# Patient Record
Sex: FEMALE | Race: WHITE | Employment: PART TIME | URBAN - NONMETROPOLITAN AREA
[De-identification: names, ages, dates, MRNs, and addresses within clinical notes are randomized per-mention and may not be internally consistent; named-entity substitution may affect disease eponyms.]

---

## 2017-01-19 ENCOUNTER — TELEPHONE (OUTPATIENT)
Dept: FAMILY MEDICINE CLINIC | Facility: CLINIC | Age: 62
End: 2017-01-19

## 2017-01-19 NOTE — TELEPHONE ENCOUNTER
Spouse advised and scheduled  Future Appointments  Date Time Provider Janet Jimenez   1/20/2017 9:45 AM Holly Chacon., DO [facility]

## 2017-01-19 NOTE — TELEPHONE ENCOUNTER
Bj Sin states that she got sick last week after taking care of grandson who had a runny nose.  Pt states that it started off as weakness and head congestion and now it is all in her chest. Pt states she was taking advil cold and sinus and that did help with

## 2017-02-06 ENCOUNTER — MED REC SCAN ONLY (OUTPATIENT)
Dept: FAMILY MEDICINE CLINIC | Facility: CLINIC | Age: 62
End: 2017-02-06

## 2017-08-19 ENCOUNTER — TELEPHONE (OUTPATIENT)
Dept: FAMILY MEDICINE CLINIC | Facility: CLINIC | Age: 62
End: 2017-08-19

## 2017-08-19 NOTE — TELEPHONE ENCOUNTER
IS THERE SOMETHING SHE CAN BE PRESCRIBED FOR POISON MANFRED THAT IS STRONGER THAN HYDROCORTISONE CREAM OR CALAMINE LOTION?

## 2017-08-19 NOTE — TELEPHONE ENCOUNTER
Called and spoke to patient, she states she has had it for 2 weeks, she has it on ankles, knees, thighs, calfs, and left arm. She is using hydrocortisone cream and calamine lotion which is giving her some relief for a little while but then comes back.  Pa

## 2017-08-22 ENCOUNTER — OFFICE VISIT (OUTPATIENT)
Dept: FAMILY MEDICINE CLINIC | Facility: CLINIC | Age: 62
End: 2017-08-22

## 2017-08-22 VITALS
SYSTOLIC BLOOD PRESSURE: 138 MMHG | OXYGEN SATURATION: 98 % | HEART RATE: 68 BPM | TEMPERATURE: 98 F | BODY MASS INDEX: 33 KG/M2 | WEIGHT: 202 LBS | DIASTOLIC BLOOD PRESSURE: 80 MMHG

## 2017-08-22 DIAGNOSIS — L25.5 RHUS DERMATITIS: Primary | ICD-10-CM

## 2017-08-22 PROCEDURE — 99213 OFFICE O/P EST LOW 20 MIN: CPT | Performed by: PHYSICIAN ASSISTANT

## 2017-08-22 RX ORDER — PREDNISONE 10 MG/1
TABLET ORAL
Qty: 30 TABLET | Refills: 0 | Status: SHIPPED | OUTPATIENT
Start: 2017-08-22 | End: 2019-02-27 | Stop reason: ALTCHOICE

## 2017-08-22 RX ORDER — BRINZOLAMIDE 10 MG/ML
1 SUSPENSION/ DROPS OPHTHALMIC 3 TIMES DAILY
COMMUNITY
End: 2019-02-27 | Stop reason: DRUGHIGH

## 2017-08-22 RX ORDER — CLOBETASOL PROPIONATE 0.5 MG/G
CREAM TOPICAL
Qty: 30 G | Refills: 1 | Status: SHIPPED | OUTPATIENT
Start: 2017-08-22 | End: 2019-02-27 | Stop reason: ALTCHOICE

## 2017-08-22 NOTE — PATIENT INSTRUCTIONS
1.  Prednisone taper as prescribed over 10 days. 2.  Clobetasol cream as directed, may apply to affected areas once or twice daily as needed for up to 2 weeks. Do not use on face.          Managing a Poison Ivy, [place], or [employer] Reaction  If y A mild rash may become red, swollen, and itchy. The rash may form a line on your skin where you brushed against the plant. If you have a severe rash, your itching may worsen. And your rash may blister and ooze. If this happens, seek medical care.  The fluid

## 2017-08-22 NOTE — PROGRESS NOTES
CHIEF COMPLAINT:   Patient presents with:  Derm Problem: x 2-3 weeks. HPI:   Ashley Gregory is a 59year old female who presents for evaluation of a rash. Per patient rash started in the past 2-3 weeks.  Rash has been gradually worsening since GENERAL: feels well otherwise, no fever, no chills. SKIN: Per HPI. No edema. No ulcerations. HEENT: Denies rhinorrhea, edema of the lips or swelling of throat. CARDIOVASCULAR: Denies chest pains or palpitations.   LUNGS: Denies shortness of breath with e Sig: Apply to affected areas once or twice daily as needed. Do not use on face.       predniSONE 10 MG Oral Tab 30 tablet 0      Si tabs po qd x 2 days, 4 tabs po qd x 2 days, 3 tabs po qd x 2 days, 2 tabs po qd x 2 days, 1 tab po qd x 2 days then s · Use over-the-counter treatments on your skin, such as cortisone, compresses of Burow’s solution, and calamine lotion. How your skin may react  A mild rash may become red, swollen, and itchy.  The rash may form a line on your skin where you brushed agains

## 2019-01-25 ENCOUNTER — TELEPHONE (OUTPATIENT)
Dept: FAMILY MEDICINE CLINIC | Facility: CLINIC | Age: 64
End: 2019-01-25

## 2019-01-25 NOTE — TELEPHONE ENCOUNTER
Left detailed message for pt to call back and let us know who PCP is. If no longer Dr. Racheal Puga, we need to know. Otherwise she needs to schedule a complete physical and fasting labs.

## 2019-02-27 ENCOUNTER — OFFICE VISIT (OUTPATIENT)
Dept: FAMILY MEDICINE CLINIC | Facility: CLINIC | Age: 64
End: 2019-02-27
Payer: MEDICARE

## 2019-02-27 ENCOUNTER — LAB ENCOUNTER (OUTPATIENT)
Dept: LAB | Age: 64
End: 2019-02-27
Attending: FAMILY MEDICINE
Payer: COMMERCIAL

## 2019-02-27 VITALS
HEIGHT: 62 IN | TEMPERATURE: 98 F | RESPIRATION RATE: 12 BRPM | SYSTOLIC BLOOD PRESSURE: 138 MMHG | DIASTOLIC BLOOD PRESSURE: 80 MMHG | BODY MASS INDEX: 36.09 KG/M2 | WEIGHT: 196.13 LBS | HEART RATE: 72 BPM

## 2019-02-27 DIAGNOSIS — H40.1111 PRIMARY OPEN ANGLE GLAUCOMA (POAG) OF RIGHT EYE, MILD STAGE: Primary | ICD-10-CM

## 2019-02-27 DIAGNOSIS — Z13.220 SCREENING FOR LIPID DISORDERS: ICD-10-CM

## 2019-02-27 DIAGNOSIS — Z00.00 LABORATORY EXAM ORDERED AS PART OF ROUTINE GENERAL MEDICAL EXAMINATION: ICD-10-CM

## 2019-02-27 DIAGNOSIS — Z11.59 NEED FOR HEPATITIS C SCREENING TEST: ICD-10-CM

## 2019-02-27 DIAGNOSIS — Z12.31 VISIT FOR SCREENING MAMMOGRAM: ICD-10-CM

## 2019-02-27 DIAGNOSIS — Z13.0 SCREENING, ANEMIA, DEFICIENCY, IRON: ICD-10-CM

## 2019-02-27 DIAGNOSIS — Z78.0 POSTMENOPAUSAL: ICD-10-CM

## 2019-02-27 DIAGNOSIS — H40.1122 PRIMARY OPEN ANGLE GLAUCOMA (POAG) OF LEFT EYE, MODERATE STAGE: ICD-10-CM

## 2019-02-27 DIAGNOSIS — Z12.4 CERVICAL CANCER SCREENING: ICD-10-CM

## 2019-02-27 DIAGNOSIS — Z00.00 ENCOUNTER FOR ANNUAL HEALTH EXAMINATION: ICD-10-CM

## 2019-02-27 DIAGNOSIS — E66.9 OBESITY (BMI 35.0-39.9 WITHOUT COMORBIDITY): ICD-10-CM

## 2019-02-27 DIAGNOSIS — Z23 NEED FOR VACCINATION: ICD-10-CM

## 2019-02-27 LAB
ALBUMIN SERPL-MCNC: 4 G/DL (ref 3.4–5)
ALBUMIN/GLOB SERPL: 1 {RATIO} (ref 1–2)
ALP LIVER SERPL-CCNC: 78 U/L (ref 50–130)
ALT SERPL-CCNC: 22 U/L (ref 13–56)
ANION GAP SERPL CALC-SCNC: 8 MMOL/L (ref 0–18)
AST SERPL-CCNC: 18 U/L (ref 15–37)
BASOPHILS # BLD AUTO: 0.04 X10(3) UL (ref 0–0.2)
BASOPHILS NFR BLD AUTO: 0.6 %
BILIRUB SERPL-MCNC: 0.4 MG/DL (ref 0.1–2)
BUN BLD-MCNC: 21 MG/DL (ref 7–18)
BUN/CREAT SERPL: 18.8 (ref 10–20)
CALCIUM BLD-MCNC: 9.5 MG/DL (ref 8.5–10.1)
CHLORIDE SERPL-SCNC: 109 MMOL/L (ref 98–107)
CHOLEST SMN-MCNC: 225 MG/DL (ref ?–200)
CO2 SERPL-SCNC: 24 MMOL/L (ref 21–32)
CREAT BLD-MCNC: 1.12 MG/DL (ref 0.55–1.02)
DEPRECATED RDW RBC AUTO: 42.5 FL (ref 35.1–46.3)
EOSINOPHIL # BLD AUTO: 0.2 X10(3) UL (ref 0–0.7)
EOSINOPHIL NFR BLD AUTO: 3 %
ERYTHROCYTE [DISTWIDTH] IN BLOOD BY AUTOMATED COUNT: 13 % (ref 11–15)
GLOBULIN PLAS-MCNC: 4 G/DL (ref 2.8–4.4)
GLUCOSE BLD-MCNC: 93 MG/DL (ref 70–99)
HCT VFR BLD AUTO: 42.8 % (ref 35–48)
HCV AB SERPL QL IA: NONREACTIVE
HDLC SERPL-MCNC: 71 MG/DL (ref 40–59)
HGB BLD-MCNC: 14.2 G/DL (ref 12–16)
IMM GRANULOCYTES # BLD AUTO: 0.01 X10(3) UL (ref 0–1)
IMM GRANULOCYTES NFR BLD: 0.1 %
LDLC SERPL CALC-MCNC: 130 MG/DL (ref ?–100)
LYMPHOCYTES # BLD AUTO: 2.27 X10(3) UL (ref 1–4)
LYMPHOCYTES NFR BLD AUTO: 33.5 %
M PROTEIN MFR SERPL ELPH: 8 G/DL (ref 6.4–8.2)
MCH RBC QN AUTO: 30 PG (ref 26–34)
MCHC RBC AUTO-ENTMCNC: 33.2 G/DL (ref 31–37)
MCV RBC AUTO: 90.5 FL (ref 80–100)
MONOCYTES # BLD AUTO: 0.7 X10(3) UL (ref 0.1–1)
MONOCYTES NFR BLD AUTO: 10.3 %
NEUTROPHILS # BLD AUTO: 3.55 X10 (3) UL (ref 1.5–7.7)
NEUTROPHILS # BLD AUTO: 3.55 X10(3) UL (ref 1.5–7.7)
NEUTROPHILS NFR BLD AUTO: 52.5 %
NONHDLC SERPL-MCNC: 154 MG/DL (ref ?–130)
OSMOLALITY SERPL CALC.SUM OF ELEC: 295 MOSM/KG (ref 275–295)
PLATELET # BLD AUTO: 333 10(3)UL (ref 150–450)
POTASSIUM SERPL-SCNC: 4.1 MMOL/L (ref 3.5–5.1)
RBC # BLD AUTO: 4.73 X10(6)UL (ref 3.8–5.3)
SODIUM SERPL-SCNC: 141 MMOL/L (ref 136–145)
TRIGL SERPL-MCNC: 122 MG/DL (ref 30–149)
VLDLC SERPL CALC-MCNC: 24 MG/DL (ref 0–30)
WBC # BLD AUTO: 6.8 X10(3) UL (ref 4–11)

## 2019-02-27 PROCEDURE — G0402 INITIAL PREVENTIVE EXAM: HCPCS | Performed by: FAMILY MEDICINE

## 2019-02-27 PROCEDURE — 88175 CYTOPATH C/V AUTO FLUID REDO: CPT | Performed by: FAMILY MEDICINE

## 2019-02-27 PROCEDURE — G0101 CA SCREEN;PELVIC/BREAST EXAM: HCPCS | Performed by: FAMILY MEDICINE

## 2019-02-27 PROCEDURE — 90670 PCV13 VACCINE IM: CPT | Performed by: FAMILY MEDICINE

## 2019-02-27 PROCEDURE — 36415 COLL VENOUS BLD VENIPUNCTURE: CPT | Performed by: FAMILY MEDICINE

## 2019-02-27 PROCEDURE — 87624 HPV HI-RISK TYP POOLED RSLT: CPT | Performed by: FAMILY MEDICINE

## 2019-02-27 PROCEDURE — G0009 ADMIN PNEUMOCOCCAL VACCINE: HCPCS | Performed by: FAMILY MEDICINE

## 2019-02-27 RX ORDER — CARTEOLOL HYDROCHLORIDE 10 MG/ML
1 SOLUTION/ DROPS OPHTHALMIC DAILY
COMMUNITY
Start: 2017-12-08

## 2019-02-27 RX ORDER — BRINZOLAMIDE 10 MG/ML
1 SUSPENSION/ DROPS OPHTHALMIC 2 TIMES DAILY
COMMUNITY
Start: 2018-07-09

## 2019-02-27 NOTE — H&P
HPI:   Ashley Gregory is a 70year old female Patient presents with:  Physical: Welcome to [facility] Readings from Last 6 Encounters:  02/27/19 : 196 lb 2 oz  08/22/17 : 202 lb  10/10/16 : 200 lb  08/27/15 : 196 lb 12.8 oz  08/01/13 : 185 lb 6 oz  0 skin lesions or rashes  EYES:denies blurred vision or double vision, glasses/ contacts: +, last eye exam :2/25/19  HEENT: denies nasal congestion, pnd, or ST, denies snoring and reported periods of apnea, denies hearing deficits, +tinnitus left ear  LUNGS: veins on lower extremities  HEENT: Ears:  TMs intact, canals clear, hearing normal, Nose: turbinates clear,Septum midline, Pharynx: no pnd, erythema, or airway obstruction  EYES:PERRLA, EOMI, Fundi: benign,conjunctiva are clear  NECK: supple,no adenopathy, lifetime)      Lipid Panel [E]      Comp Metabolic Panel (14) [E]      CBC W Differential W Platelet [E]      pneumococcal 13-Kallie Conj Vacc (PREVNAR 13) Intramuscular Suspension      *Venipuncture      ThinPrep PAP with HPV Reflex Request    Meds & Refills Welcome to Medicare, and non-screening if indicated for medical reasons Electrocardiogram date Routine EKG is not a screening covered service except at the Welcome to Medicare Visit    Abdominal aortic aneurysm screening (once between ages 73-68) IPPE only patient. Chlamydia  Annually if high risk No results found for: CHLAMYDIA No flowsheet data found.     Screening Mammogram      Mammogram    Recommend Annually to at least age 76, and as needed after 76 Mammogram due on 11/08/2017 Please get this Mammog Advance Directives. It also has the State forms available on it's website for anyone to review and print using their home computer and printer. (the forms are also available in [address])  [email or web address]  This link also has information from the [facility] [place] [facility] recommendations for cervical cancer screening. As it has been over 6 years since patient has had a Pap, will obtain Pap smear today.   If negative no further cervical cancer screening necessary  - THINPREP PAP WITH HPV REFLEX REQUEST B; Future  Delia Epstein

## 2019-02-27 NOTE — PATIENT INSTRUCTIONS
Natali Lobo's SCREENING SCHEDULE   Tests on this list are recommended by your physician but may not be covered, or covered at this frequency, by your insurer. Please check with your insurance carrier before scheduling to verify coverage.    MANOLO Age 74     Colonoscopy Screen   Covered every 10 years- more often if abnormal Colonoscopy due on 01/31/2020 Update Health Maintenance if applicable    Flex Sigmoidoscopy Screen  Covered every 5 years No results found for this or any previous visit.  No carlton visit on 02/27/19   • PNEUMOCOCCAL VACC, 13 СВЕТЛАНА IM    Please get once after your 65th birthday    Pneumococcal 23 (Pneumovax)  Covered Once after 65 No orders found for this or any previous visit.  Please get once after your 65th birthday    Hepatitis B for Visit for screening mammogram  I discussed importance of monthly self breast exams and annual mammograms. Prescription for mammogram faxed to [facility] patient's request  - [facility] SCREENING BILAT (CPT=[id number]); Future    3.  Need for vaccination  I reviewed a

## 2019-03-01 LAB — HPV I/H RISK 1 DNA SPEC QL NAA+PROBE: NEGATIVE

## 2019-10-02 ENCOUNTER — PATIENT OUTREACH (OUTPATIENT)
Dept: FAMILY MEDICINE CLINIC | Facility: CLINIC | Age: 64
End: 2019-10-02

## 2019-10-19 ENCOUNTER — TELEPHONE (OUTPATIENT)
Dept: FAMILY MEDICINE CLINIC | Facility: CLINIC | Age: 64
End: 2019-10-19

## 2020-02-06 ENCOUNTER — PATIENT OUTREACH (OUTPATIENT)
Dept: FAMILY MEDICINE CLINIC | Facility: CLINIC | Age: 65
End: 2020-02-06

## 2020-08-11 ENCOUNTER — TELEPHONE (OUTPATIENT)
Dept: FAMILY MEDICINE CLINIC | Facility: CLINIC | Age: 65
End: 2020-08-11

## 2020-08-11 DIAGNOSIS — Z12.31 ENCOUNTER FOR SCREENING MAMMOGRAM FOR BREAST CANCER: Primary | ICD-10-CM

## 2020-08-11 NOTE — TELEPHONE ENCOUNTER
11/08/2016 HAD MAMMOGRAM DONE AT [address] [place] [address] Claude PATIENT WOULD LIKE TO HAVE HER MAMMOGRAM DONE AT [facility] IN [address] [place] [address]. PLEASE CALL PATIENT WHEN ORDER IS READY.

## 2020-08-12 ENCOUNTER — TELEPHONE (OUTPATIENT)
Dept: FAMILY MEDICINE CLINIC | Facility: CLINIC | Age: 65
End: 2020-08-12

## 2020-08-12 NOTE — TELEPHONE ENCOUNTER
Pt called yesterday and requested a mammogram order to be faxed to [facility]---where she had her last one done.    I (inadvertently) sent an order for a 2D/3D screening mammogram, rather than just a screening mammogram.    Pt is now asking if she should g

## 2020-08-12 NOTE — TELEPHONE ENCOUNTER
Patient has questions for the nurse regarding the 3D Mammogram and if it is worth getting one done? Is there a facility in the [facility] that does the 3D Mammograms? Please call patient to advise.

## 2020-08-12 NOTE — TELEPHONE ENCOUNTER
Please advise patient that I have not seen her for a year and a half. She should schedule an annual wellness visit. It does not appear that she has had a mammogram for 4 years.   She is seeing a gynecologist or another provider who was performing breast e

## 2020-09-28 ENCOUNTER — HOSPITAL ENCOUNTER (OUTPATIENT)
Dept: MAMMOGRAPHY | Age: 65
Discharge: HOME | End: 2020-09-28
Attending: FAMILY MEDICINE
Payer: MEDICARE

## 2020-09-28 DIAGNOSIS — Z12.31 ENCOUNTER FOR SCREENING MAMMOGRAM FOR BREAST CANCER: ICD-10-CM

## 2020-09-28 PROCEDURE — 77063 BREAST TOMOSYNTHESIS BI: CPT | Performed by: FAMILY MEDICINE

## 2020-09-28 PROCEDURE — 77067 SCR MAMMO BI INCL CAD: CPT | Performed by: FAMILY MEDICINE

## 2020-09-29 ENCOUNTER — TELEPHONE (OUTPATIENT)
Dept: FAMILY MEDICINE CLINIC | Facility: CLINIC | Age: 65
End: 2020-09-29

## 2020-10-08 ENCOUNTER — TELEPHONE (OUTPATIENT)
Dept: FAMILY MEDICINE CLINIC | Facility: CLINIC | Age: 65
End: 2020-10-08

## 2020-10-08 NOTE — TELEPHONE ENCOUNTER
Pt has PCV 13 done on 2/27/19. She asks if she is due/ can she get Pneumovax 23?     Reminded she is due for wellness exam-----2/27/19 was her last OV

## 2020-11-23 ENCOUNTER — OFFICE VISIT (OUTPATIENT)
Dept: FAMILY MEDICINE CLINIC | Facility: CLINIC | Age: 65
End: 2020-11-23
Payer: MEDICARE

## 2020-11-23 VITALS — OXYGEN SATURATION: 99 % | TEMPERATURE: 98 F | HEART RATE: 78 BPM

## 2020-11-23 DIAGNOSIS — H10.33 ACUTE CONJUNCTIVITIS OF BOTH EYES, UNSPECIFIED ACUTE CONJUNCTIVITIS TYPE: Primary | ICD-10-CM

## 2020-11-23 PROCEDURE — 99213 OFFICE O/P EST LOW 20 MIN: CPT | Performed by: FAMILY MEDICINE

## 2020-11-23 NOTE — PROGRESS NOTES
Beba Singh is a 75year old female. Patient presents with:  Irritation: Patient verbalized that she has had red and itchy eyes for 4 days       HPI:   Patient started using a new eyedrop for her glaucoma. She continued her previous.   She started to recorded    GENERAL: well developed, well nourished,in no apparent distress  SKIN: no rashes,no suspicious lesions  HEENT: atraumatic, normocephalic, R TM normal, L TM normal, Pharynx normal  PERRLA, EOMI, palpebral conjunctive are mildly injected, bulbar

## 2021-01-29 ENCOUNTER — TELEPHONE (OUTPATIENT)
Dept: FAMILY MEDICINE CLINIC | Facility: CLINIC | Age: 66
End: 2021-01-29

## 2021-01-29 NOTE — TELEPHONE ENCOUNTER
Spoke with patient and informed her it is a personal choice. She is thinking she will probably get it.

## 2021-01-29 NOTE — TELEPHONE ENCOUNTER
SHE IS GETTING PRESSURE FROM HER WORK PLACE TO GET THE COVID SHOT.  SHE WANTS THE OPINION OF DR Alistair Carey

## 2021-03-15 DIAGNOSIS — Z23 NEED FOR VACCINATION: ICD-10-CM

## 2021-04-24 RX ORDER — CEPHALEXIN 500 MG/1
500 CAPSULE ORAL 3 TIMES DAILY
Qty: 21 CAPSULE | Refills: 0 | Status: SHIPPED | OUTPATIENT
Start: 2021-04-24 | End: 2021-05-01

## 2021-04-26 ENCOUNTER — TELEPHONE (OUTPATIENT)
Dept: FAMILY MEDICINE CLINIC | Facility: CLINIC | Age: 66
End: 2021-04-26

## 2021-04-26 NOTE — TELEPHONE ENCOUNTER
with folliculitis. Pt reports similar rash >4wks. Hasn't seen Dr. Erika Villegas since 2019.     OV scheduled on 4/27 for migdalia

## 2021-04-26 NOTE — TELEPHONE ENCOUNTER
She has a question about the same rash her  has.  He sent a picture of his to Dr Susie Her so she wants to know if she can send a picture of hers

## 2021-04-30 ENCOUNTER — OFFICE VISIT (OUTPATIENT)
Dept: FAMILY MEDICINE CLINIC | Facility: CLINIC | Age: 66
End: 2021-04-30
Payer: MEDICARE

## 2021-04-30 VITALS
SYSTOLIC BLOOD PRESSURE: 110 MMHG | HEART RATE: 68 BPM | RESPIRATION RATE: 14 BRPM | HEIGHT: 62 IN | TEMPERATURE: 98 F | BODY MASS INDEX: 35.7 KG/M2 | DIASTOLIC BLOOD PRESSURE: 76 MMHG | WEIGHT: 194 LBS

## 2021-04-30 DIAGNOSIS — L30.9 DERMATITIS: Primary | ICD-10-CM

## 2021-04-30 PROBLEM — H40.1122 PRIMARY OPEN ANGLE GLAUCOMA OF LEFT EYE, MODERATE STAGE: Status: ACTIVE | Noted: 2019-04-02

## 2021-04-30 PROCEDURE — 3074F SYST BP LT 130 MM HG: CPT | Performed by: FAMILY MEDICINE

## 2021-04-30 PROCEDURE — 3008F BODY MASS INDEX DOCD: CPT | Performed by: FAMILY MEDICINE

## 2021-04-30 PROCEDURE — 3078F DIAST BP <80 MM HG: CPT | Performed by: FAMILY MEDICINE

## 2021-04-30 PROCEDURE — 99213 OFFICE O/P EST LOW 20 MIN: CPT | Performed by: FAMILY MEDICINE

## 2021-04-30 RX ORDER — PERMETHRIN 50 MG/G
CREAM TOPICAL
Qty: 1 TUBE | Refills: 0 | Status: SHIPPED | OUTPATIENT
Start: 2021-04-30 | End: 2021-09-28 | Stop reason: ALTCHOICE

## 2021-04-30 RX ORDER — TRIAMTERENE AND HYDROCHLOROTHIAZIDE 37.5; 25 MG/1; MG/1
1 CAPSULE ORAL DAILY
COMMUNITY
Start: 2021-02-27 | End: 2021-09-28 | Stop reason: ALTCHOICE

## 2021-04-30 NOTE — PATIENT INSTRUCTIONS
I discussed various causes of the rash. Given their pets recent dermatologic problems, would recommend empiric treatment with permethrin. Would treat her  at the same time as well.   Prescriptions provided  Would recommend cooler showers, pat dry,

## 2021-04-30 NOTE — PROGRESS NOTES
Lor Villarreal is a 77year old female. Patient presents with:  Rash: itchy; all over; couple months    HPI:    with a recent outbreak of what appears to be a folliculitis, treated with cephalexin.   C/o itchy spots off and on at various places, pt the trunk, extremities, hands and feet spared, no lesions around the neck  ENT: TMs: intact, good mobility, Nose: turbinates clear, no dc, Throat: no erythema, pnd, or lesions  NECK: supple,no adenopathy,no bruits, no thyromegaly  LUNGS: clear to auscultat

## 2021-05-20 ENCOUNTER — TELEPHONE (OUTPATIENT)
Dept: FAMILY MEDICINE CLINIC | Facility: CLINIC | Age: 66
End: 2021-05-20

## 2021-05-20 NOTE — TELEPHONE ENCOUNTER
Spoke with pt. Pt was seen on 4/30/21 for dermatitis. She was prescribed cephalexin 500mg TID x7days on 4/24---no improvement. At her OV, she was prescribed Elimite. No improvement after using that either.   Same areas, but no has a patches on her toes

## 2021-05-20 NOTE — TELEPHONE ENCOUNTER
PT. ASKING IF DR. CHANEL WOULD CONSIDER PUTTING HER ON A STEROID FOR THE ITCHING/SHE SAID HE SAW HER A MONTH AGO FOR THE ITCHING.

## 2021-05-21 RX ORDER — PREDNISONE 20 MG/1
20 TABLET ORAL 2 TIMES DAILY
Qty: 10 TABLET | Refills: 0 | Status: SHIPPED | OUTPATIENT
Start: 2021-05-21 | End: 2021-05-26

## 2021-05-21 NOTE — TELEPHONE ENCOUNTER
Detailed message left with patient regarding Dr. Alex Car recommendations. Script sent to [facility] in [place].

## 2021-09-16 LAB — AMB EXT COVID-19 RESULT: DETECTED

## 2021-09-17 ENCOUNTER — PATIENT MESSAGE (OUTPATIENT)
Dept: FAMILY MEDICINE CLINIC | Facility: CLINIC | Age: 66
End: 2021-09-17

## 2021-09-17 ENCOUNTER — TELEPHONE (OUTPATIENT)
Dept: FAMILY MEDICINE CLINIC | Facility: CLINIC | Age: 66
End: 2021-09-17

## 2021-09-17 NOTE — TELEPHONE ENCOUNTER
Patient's  is currently in the hospital with COVID Pneumonia, and now patient tested positive for COVID at [place]. She is wanting to know if she can get the antibody infusion done?  Her symptoms are currently mild fatigue, low grade fever of 99.3,

## 2021-09-17 NOTE — TELEPHONE ENCOUNTER
From: Emelyn Cerrato  To: Joy Valerio DO  Sent: 9/17/2021 1:36 PM CDT  Subject: Positive Covid Test    Please schedule an antibody treatment. Thankyou!

## 2021-09-18 ENCOUNTER — TELEPHONE (OUTPATIENT)
Dept: FAMILY MEDICINE CLINIC | Facility: CLINIC | Age: 66
End: 2021-09-18

## 2021-09-18 ENCOUNTER — HOSPITAL ENCOUNTER (OUTPATIENT)
Age: 66
Discharge: HOME | End: 2021-09-18
Payer: MEDICARE

## 2021-09-18 VITALS
OXYGEN SATURATION: 100 % | SYSTOLIC BLOOD PRESSURE: 125 MMHG | TEMPERATURE: 99 F | RESPIRATION RATE: 18 BRPM | HEART RATE: 74 BPM | DIASTOLIC BLOOD PRESSURE: 76 MMHG | BODY MASS INDEX: 28.93 KG/M2 | HEIGHT: 66 IN | WEIGHT: 180 LBS

## 2021-09-18 DIAGNOSIS — U07.1 COVID-19: Primary | ICD-10-CM

## 2021-09-18 PROCEDURE — M0243 CASIRIVI AND IMDEVI INFUSION: HCPCS | Performed by: NURSE PRACTITIONER

## 2021-09-18 PROCEDURE — 99203 OFFICE O/P NEW LOW 30 MIN: CPT | Performed by: NURSE PRACTITIONER

## 2021-09-18 NOTE — ED QUICK NOTES
Pt reviewed medication information. All questions answered.   Pt verbalized understanding of risks/benefits

## 2021-09-18 NOTE — TELEPHONE ENCOUNTER
Patient is on her way to  to be seen as a patient. She was trying to get a virtual visit with this office after being told she needs to be seen. This office is currently full and unable to accommodate. Patient states thanks for nothing.  Advise either way

## 2021-09-18 NOTE — ED PROVIDER NOTES
Patient Seen in: Immediate [address] [place] [address]      History   Patient presents with:  Medication Administration    Stated Complaint: Infusion     Subjective:   56-year-old female presents the IC who recent tested positive for Covid on 9/16/2021.   [facility]  HEENT: Normocephalic. Atraumatic. Extraocular motions are intact  NECK: Supple. No meningitic signs are noted. CHEST/LUNGS: Clear to auscultation. There is no respiratory distress noted. HEART/CARDIOVASCULAR: Regular.   There is no Disposition:  Discharge  9/18/2021  1:58 pm    Follow-up:  Chidi Galan DO  [address] [address]  [phone]                Medications Prescribed:  Discharge Medication List as of 9/18/2021  1:59 PM

## 2021-09-18 NOTE — TELEPHONE ENCOUNTER
SALVATORE NEEDS AN ORDER FOR AN ANTIBODY INFUSION, SHE IS COVID+, [facility]

## 2021-09-20 ENCOUNTER — TELEPHONE (OUTPATIENT)
Dept: CASE MANAGEMENT | Age: 66
End: 2021-09-20

## 2021-09-20 NOTE — TELEPHONE ENCOUNTER
Home Monitoring Condition Update    Covid19+ test date: 09/18/2021    Consent Verification:  Assessment Completed With: yes  HIPAA Verified? Yes    No flowsheet data found.      Diagnosis:   • COVID19+ diagnosis  • Do you have any questions about your diag

## 2021-09-20 NOTE — TELEPHONE ENCOUNTER
Pt received PAB infusion at [address] [place] [address] on 9/18/21 for COVID-19. Please follow-up with pt for post-infusion assessment and home monitoring if needed. Thank you.

## 2021-09-22 ENCOUNTER — TELEMEDICINE (OUTPATIENT)
Dept: FAMILY MEDICINE CLINIC | Facility: CLINIC | Age: 66
End: 2021-09-22
Payer: MEDICARE

## 2021-09-22 ENCOUNTER — TELEPHONE (OUTPATIENT)
Dept: FAMILY MEDICINE CLINIC | Facility: CLINIC | Age: 66
End: 2021-09-22

## 2021-09-22 DIAGNOSIS — U07.1 RESPIRATORY TRACT INFECTION DUE TO COVID-19 VIRUS: Primary | ICD-10-CM

## 2021-09-22 DIAGNOSIS — R05.9 COUGH: ICD-10-CM

## 2021-09-22 DIAGNOSIS — J01.10 ACUTE NON-RECURRENT FRONTAL SINUSITIS: ICD-10-CM

## 2021-09-22 DIAGNOSIS — R19.7 DIARRHEA, UNSPECIFIED TYPE: ICD-10-CM

## 2021-09-22 DIAGNOSIS — J98.8 RESPIRATORY TRACT INFECTION DUE TO COVID-19 VIRUS: Primary | ICD-10-CM

## 2021-09-22 DIAGNOSIS — R11.0 NAUSEA: ICD-10-CM

## 2021-09-22 PROCEDURE — 99214 OFFICE O/P EST MOD 30 MIN: CPT | Performed by: NURSE PRACTITIONER

## 2021-09-22 RX ORDER — ONDANSETRON 4 MG/1
4 TABLET, ORALLY DISINTEGRATING ORAL EVERY 8 HOURS PRN
Qty: 12 TABLET | Refills: 0 | Status: SHIPPED | OUTPATIENT
Start: 2021-09-22 | End: 2021-09-28 | Stop reason: ALTCHOICE

## 2021-09-22 RX ORDER — BUDESONIDE AND FORMOTEROL FUMARATE DIHYDRATE 80; 4.5 UG/1; UG/1
2 AEROSOL RESPIRATORY (INHALATION) 2 TIMES DAILY
Qty: 1 EACH | Refills: 0 | Status: SHIPPED | OUTPATIENT
Start: 2021-09-22 | End: 2021-10-06

## 2021-09-22 RX ORDER — MONTELUKAST SODIUM 10 MG/1
10 TABLET ORAL DAILY
COMMUNITY
Start: 2021-06-07

## 2021-09-22 RX ORDER — ALBUTEROL SULFATE 90 UG/1
2 AEROSOL, METERED RESPIRATORY (INHALATION) EVERY 4 HOURS PRN
Qty: 1 EACH | Refills: 0 | Status: SHIPPED | OUTPATIENT
Start: 2021-09-22

## 2021-09-22 RX ORDER — AZITHROMYCIN 250 MG/1
TABLET, FILM COATED ORAL
Qty: 6 TABLET | Refills: 0 | Status: SHIPPED | OUTPATIENT
Start: 2021-09-22 | End: 2021-09-27

## 2021-09-22 NOTE — TELEPHONE ENCOUNTER
Pt was dx'd with Covid on 9/17. Rec'd ab therapy on 9/18. She didn't answer when I called to do home monitoring yesterday. See below. Can I have her schedule a video visit with Uzair Andrews today for this?

## 2021-09-22 NOTE — PROGRESS NOTES
Virtual/Telephone Check-In    Dorota Wesley  verbally consents to a Virtual/Telephone Check-In service on 9/22/2021 . Patient understands and accepts financial responsibility for any deductible, co-insurance and/or co-pays associated with this service. Medications   Medication Sig Dispense Refill   • Albuterol Sulfate HFA (PROAIR HFA) 108 (90 Base) MCG/ACT Inhalation Aero Soln Inhale 2 puffs into the lungs every 4 (four) hours as needed for Wheezing or Shortness of Breath.  1 each 0   • Budesonide-Formote palpitations  GI: + diarrhea, denies abdominal pain, nausea, vomiting  NEURO: + headaches  EXAM:   VITALS: not available as this is a telemed visit    GENERAL: Does not appear to be in acute distress  LUNG: No dyspnea with conversation  rest of physical ex was taken to allow for sufficient and adequate time. This billing was spent on reviewing labs, medications, radiology tests and decision making. Appropriate medical decision-making and tests are ordered as detailed in the plan of care above. \"      No fo

## 2021-09-23 NOTE — TELEPHONE ENCOUNTER
Home Monitoring Condition Update    Covid19+ test date:9/16/21    Consent Verification:  Assessment Completed With: yes  HIPAA Verified?   Yes    COVID-19 HOME MONITORING 9/23/2021   Temperature 99   Reading From Mouth   SPO2 95   Pulse 93   Pulse taken fro

## 2021-09-24 NOTE — TELEPHONE ENCOUNTER
Patient scheduled for next week for a video visit. Detailed message left with patient with appointment date and time.   Future Appointments   Date Time Provider Janet Jimenez   9/28/2021 11:30 AM Chidi Galan, DO [facility]

## 2021-09-24 NOTE — TELEPHONE ENCOUNTER
.  Home Monitoring Condition Update    Covid19+ test date: 09/16/21    Consent Verification:  Assessment Completed With: Patient  HIPAA Verified?   Yes    COVID-19 HOME MONITORING 9/24/2021   Temperature 99.3   Reading From Mouth   SPO2 94   Pulse 76   Puls

## 2021-09-28 ENCOUNTER — TELEMEDICINE (OUTPATIENT)
Dept: FAMILY MEDICINE CLINIC | Facility: CLINIC | Age: 66
End: 2021-09-28

## 2021-09-28 ENCOUNTER — TELEPHONE (OUTPATIENT)
Dept: FAMILY MEDICINE CLINIC | Facility: CLINIC | Age: 66
End: 2021-09-28

## 2021-09-28 VITALS — TEMPERATURE: 99 F | HEART RATE: 82 BPM | OXYGEN SATURATION: 98 %

## 2021-09-28 DIAGNOSIS — R05.9 COUGH: ICD-10-CM

## 2021-09-28 DIAGNOSIS — U07.1 COVID-19 VIRUS INFECTION: Primary | ICD-10-CM

## 2021-09-28 PROCEDURE — 99213 OFFICE O/P EST LOW 20 MIN: CPT | Performed by: FAMILY MEDICINE

## 2021-09-28 NOTE — PROGRESS NOTES
Pablo Branch is a 77year old female. Telehealth outside of [facility] [address] Verbal Consent   I conducted a telehealth visit with Pablo Branch today, 09/28/21, which was completed using two-way, real-time interactive audio and video communication temperature greater than 100. Her T-max recently has been about 99, heart rate 82 and O2 saturation 98%. She has had a dry cough which is improved with albuterol. Her appetite has been gradually improving.  She does note a sensitivity to salt but has not lo heartburn  NEURO: denies headaches  PSYCH: Patient worried about having pneumonia    EXAM:   Pulse 82   Temp 99 °F (37.2 °C)   SpO2 98%   GENERAL: well developed, well nourished,in no apparent distress, well hydrated    ASSESSMENT AND PLAN:     Covid-19 vi

## 2021-09-28 NOTE — PATIENT INSTRUCTIONS
I advised patient that she has completed a 10-day quarantine and is at low risk of transmitting Covid. I have asked her to gradually increase her activity level and monitor her O2 saturation during activity.  She may continue albuterol up to every 4 hours a

## 2021-09-28 NOTE — TELEPHONE ENCOUNTER
Advise pt of 10 day isolation from onset of symptoms. I would anticipate day by day improvement and if not then she should be re-evaluated  A fever is temp > 100.5 orally.   She may rtw anytime

## 2021-09-28 NOTE — TELEPHONE ENCOUNTER
Pt. Is asking if she is still contagious and is should she still be in quarantine? Pt. Is asking about her temp 99.5 and when can she return to work?

## 2021-10-04 ENCOUNTER — TELEPHONE (OUTPATIENT)
Dept: FAMILY MEDICINE CLINIC | Facility: CLINIC | Age: 66
End: 2021-10-04

## 2021-10-04 NOTE — TELEPHONE ENCOUNTER
Pt advised that people can test + for up to 3 months after initial Covid infections. Pt states her employer asked her to have it done before going back to work. Advised that we do not recommend that.     Advised if she needs a note to RTW, to c/b

## 2021-10-04 NOTE — TELEPHONE ENCOUNTER
TESTED POSITIVE FOR COVID 9/16, JUST WENT & GOT TESTED AGAIN SATURDAY 10/2 @ [facility] & IT SAID SHE IS POSITIVE, DID SHE TEST TOO SOON, WHEN WILL IT SAY NEGATIVE?  SHE SAID SHE FEELS FINE, CALL PT

## 2021-10-12 ENCOUNTER — TELEPHONE (OUTPATIENT)
Dept: FAMILY MEDICINE CLINIC | Facility: CLINIC | Age: 66
End: 2021-10-12

## 2021-10-12 DIAGNOSIS — Z23 NEED FOR VACCINATION: Primary | ICD-10-CM

## 2021-10-13 NOTE — TELEPHONE ENCOUNTER
Pt returned call. Called pt and get her v/m again.   Left a detailed message advising her of below---she can schedule a nurse appt for this

## 2021-12-27 ENCOUNTER — TELEPHONE (OUTPATIENT)
Dept: FAMILY MEDICINE CLINIC | Facility: CLINIC | Age: 66
End: 2021-12-27

## 2021-12-27 DIAGNOSIS — Z86.16 HISTORY OF COVID-19: Primary | ICD-10-CM

## 2021-12-27 NOTE — TELEPHONE ENCOUNTER
Pt calls. States she had + Covid in Sept and now wants to have a lab test done to see if she still has antibodies? Asked pt what she plans to do with this info? What will the result change?   Pt states she \"just wants to know\", and then \"can go to fa

## 2021-12-27 NOTE — TELEPHONE ENCOUNTER
Pt calls stating she has hx of COVID. She isn't vaccinated. She is wondering if she can check her antibodies? Pt is going to call her insurance to see if it's affordable for her.

## 2022-01-24 ENCOUNTER — TELEPHONE (OUTPATIENT)
Dept: FAMILY MEDICINE CLINIC | Facility: CLINIC | Age: 67
End: 2022-01-24

## 2022-01-24 NOTE — TELEPHONE ENCOUNTER
TESTED POSITIVE FOR COVID THIS MORNING AND IT WAS A HOME TEST.   SHE SAID SHE FEELS FINE, JUST HAS A SINUS ISSUE AND WANTS TO KNOW WHAT SHE NEEDS TO DO AND WHAT SHE NEEDS TO TELL HER BOSS

## 2022-06-13 ENCOUNTER — TELEPHONE (OUTPATIENT)
Dept: FAMILY MEDICINE CLINIC | Facility: CLINIC | Age: 67
End: 2022-06-13

## 2022-06-13 NOTE — TELEPHONE ENCOUNTER
Chato Kee is calling she would like to know if she has had the shingles and pneumonia shot please call

## 2022-06-22 ENCOUNTER — OFFICE VISIT (OUTPATIENT)
Dept: FAMILY MEDICINE CLINIC | Facility: CLINIC | Age: 67
End: 2022-06-22
Payer: MEDICARE

## 2022-06-22 VITALS
HEIGHT: 66 IN | HEART RATE: 70 BPM | DIASTOLIC BLOOD PRESSURE: 82 MMHG | WEIGHT: 194 LBS | TEMPERATURE: 98 F | OXYGEN SATURATION: 98 % | RESPIRATION RATE: 16 BRPM | SYSTOLIC BLOOD PRESSURE: 154 MMHG | BODY MASS INDEX: 31.18 KG/M2

## 2022-06-22 DIAGNOSIS — M21.42 FLAT FEET, BILATERAL: ICD-10-CM

## 2022-06-22 DIAGNOSIS — R60.9 DEPENDENT EDEMA: Primary | ICD-10-CM

## 2022-06-22 DIAGNOSIS — R03.0 ELEVATED BLOOD PRESSURE READING: ICD-10-CM

## 2022-06-22 DIAGNOSIS — M21.41 FLAT FEET, BILATERAL: ICD-10-CM

## 2022-06-22 DIAGNOSIS — L98.9 SKIN LESION OF LEFT ARM: ICD-10-CM

## 2022-06-22 PROCEDURE — 3079F DIAST BP 80-89 MM HG: CPT | Performed by: FAMILY MEDICINE

## 2022-06-22 PROCEDURE — 3008F BODY MASS INDEX DOCD: CPT | Performed by: FAMILY MEDICINE

## 2022-06-22 PROCEDURE — 17110 DESTRUCTION B9 LES UP TO 14: CPT | Performed by: FAMILY MEDICINE

## 2022-06-22 PROCEDURE — 3077F SYST BP >= 140 MM HG: CPT | Performed by: FAMILY MEDICINE

## 2022-06-22 PROCEDURE — 99214 OFFICE O/P EST MOD 30 MIN: CPT | Performed by: FAMILY MEDICINE

## 2022-06-22 RX ORDER — LATANOPROSTENE BUNOD 0.24 MG/ML
1 SOLUTION/ DROPS OPHTHALMIC NIGHTLY
COMMUNITY
Start: 2022-04-23

## 2022-06-22 RX ORDER — TRIAMTERENE AND HYDROCHLOROTHIAZIDE 37.5; 25 MG/1; MG/1
1 CAPSULE ORAL EVERY MORNING
Qty: 30 CAPSULE | Refills: 0 | Status: SHIPPED | OUTPATIENT
Start: 2022-06-22

## 2022-06-22 NOTE — PATIENT INSTRUCTIONS
I reviewed goals for blood pressure as well as conservative management of hypertension including sodium restriction, daily aerobic activity, alcohol moderation, smoking cessation, and maintaining ideal body weight. Will start Dyazide 1 every morning for the next 5 days, I have asked patient begin monitoring her blood pressure taking every 3 consecutive readings at various times of the day and reporting her numbers over the next week. Discussed possible role of foot anatomy causing discomfort in ankles. If progressive discomfort, would recommend podiatry evaluation  Reviewed signs and symptoms of skin cancer. We will treat lesion with liquid nitrogen and observe for resolution.

## 2022-07-14 RX ORDER — TRIAMTERENE AND HYDROCHLOROTHIAZIDE 37.5; 25 MG/1; MG/1
CAPSULE ORAL
Qty: 30 CAPSULE | Refills: 0 | Status: SHIPPED | OUTPATIENT
Start: 2022-07-14

## 2022-08-18 RX ORDER — TRIAMTERENE AND HYDROCHLOROTHIAZIDE 37.5; 25 MG/1; MG/1
CAPSULE ORAL
Qty: 30 CAPSULE | Refills: 0 | Status: SHIPPED | OUTPATIENT
Start: 2022-08-18

## 2022-09-07 DIAGNOSIS — R03.0 ELEVATED BLOOD PRESSURE READING: ICD-10-CM

## 2022-09-07 DIAGNOSIS — Z79.899 ENCOUNTER FOR LONG-TERM (CURRENT) DRUG USE: ICD-10-CM

## 2022-09-07 DIAGNOSIS — R60.9 DEPENDENT EDEMA: Primary | ICD-10-CM

## 2022-09-07 RX ORDER — TRIAMTERENE AND HYDROCHLOROTHIAZIDE 37.5; 25 MG/1; MG/1
1 CAPSULE ORAL EVERY MORNING
Qty: 30 CAPSULE | Refills: 0 | Status: SHIPPED | OUTPATIENT
Start: 2022-09-07

## 2022-09-13 ENCOUNTER — LAB ENCOUNTER (OUTPATIENT)
Dept: LAB | Age: 67
End: 2022-09-13
Attending: FAMILY MEDICINE
Payer: MEDICARE

## 2022-09-13 DIAGNOSIS — Z79.899 ENCOUNTER FOR LONG-TERM (CURRENT) DRUG USE: ICD-10-CM

## 2022-09-13 DIAGNOSIS — R03.0 ELEVATED BLOOD PRESSURE READING: ICD-10-CM

## 2022-09-13 DIAGNOSIS — R60.9 DEPENDENT EDEMA: ICD-10-CM

## 2022-09-13 LAB
ANION GAP SERPL CALC-SCNC: 4 MMOL/L (ref 0–18)
BUN BLD-MCNC: 20 MG/DL (ref 7–18)
CALCIUM BLD-MCNC: 9.7 MG/DL (ref 8.5–10.1)
CHLORIDE SERPL-SCNC: 108 MMOL/L (ref 98–112)
CO2 SERPL-SCNC: 26 MMOL/L (ref 21–32)
CREAT BLD-MCNC: 1.08 MG/DL
FASTING STATUS PATIENT QL REPORTED: NO
GFR SERPLBLD BASED ON 1.73 SQ M-ARVRAT: 56 ML/MIN/1.73M2 (ref 60–?)
GLUCOSE BLD-MCNC: 124 MG/DL (ref 70–99)
OSMOLALITY SERPL CALC.SUM OF ELEC: 290 MOSM/KG (ref 275–295)
POTASSIUM SERPL-SCNC: 4.3 MMOL/L (ref 3.5–5.1)
SODIUM SERPL-SCNC: 138 MMOL/L (ref 136–145)

## 2022-09-13 PROCEDURE — 80048 BASIC METABOLIC PNL TOTAL CA: CPT

## 2022-09-13 PROCEDURE — 36415 COLL VENOUS BLD VENIPUNCTURE: CPT

## 2022-09-14 DIAGNOSIS — R60.9 DEPENDENT EDEMA: Primary | ICD-10-CM

## 2022-09-14 DIAGNOSIS — Z79.899 ENCOUNTER FOR LONG-TERM (CURRENT) DRUG USE: ICD-10-CM

## 2022-09-14 DIAGNOSIS — R03.0 ELEVATED BLOOD PRESSURE READING: ICD-10-CM

## 2023-04-06 DIAGNOSIS — J98.8 RESPIRATORY TRACT INFECTION DUE TO COVID-19 VIRUS: ICD-10-CM

## 2023-04-06 DIAGNOSIS — U07.1 RESPIRATORY TRACT INFECTION DUE TO COVID-19 VIRUS: ICD-10-CM

## 2023-04-06 DIAGNOSIS — R05.9 COUGH: ICD-10-CM

## 2023-04-06 NOTE — TELEPHONE ENCOUNTER
Albuterol Sulfate HFA (Proair HFA)     Asthma & COPD Medication Protocol Failed 04/06/2023 03:01 PM    Asthma Action Score greater than or equal to 20    Appointment in past 6 or next 3 months     AAP/ACT given in last 12 months        Last filled: 6/22/22  No future appointments.   Last filled: 9/22/21  1 each with 0 refills  Last labs: 9/13/22

## 2023-04-07 RX ORDER — ALBUTEROL SULFATE 90 UG/1
2 AEROSOL, METERED RESPIRATORY (INHALATION) EVERY 4 HOURS PRN
Qty: 1 EACH | Refills: 0 | Status: SHIPPED | OUTPATIENT
Start: 2023-04-07

## 2023-06-21 ENCOUNTER — TELEPHONE (OUTPATIENT)
Dept: FAMILY MEDICINE CLINIC | Facility: CLINIC | Age: 68
End: 2023-06-21

## 2023-06-21 DIAGNOSIS — Z12.31 ENCOUNTER FOR SCREENING MAMMOGRAM FOR BREAST CANCER: Primary | ICD-10-CM

## 2023-07-11 ENCOUNTER — HOSPITAL ENCOUNTER (OUTPATIENT)
Dept: MAMMOGRAPHY | Age: 68
Discharge: HOME | End: 2023-07-11
Attending: FAMILY MEDICINE
Payer: MEDICARE

## 2023-07-11 DIAGNOSIS — Z12.31 ENCOUNTER FOR SCREENING MAMMOGRAM FOR BREAST CANCER: ICD-10-CM

## 2023-07-11 PROCEDURE — 77067 SCR MAMMO BI INCL CAD: CPT | Performed by: FAMILY MEDICINE

## 2023-07-11 PROCEDURE — 77063 BREAST TOMOSYNTHESIS BI: CPT | Performed by: FAMILY MEDICINE

## 2023-09-06 ENCOUNTER — PATIENT MESSAGE (OUTPATIENT)
Dept: FAMILY MEDICINE CLINIC | Facility: CLINIC | Age: 68
End: 2023-09-06

## 2023-09-06 ENCOUNTER — TELEPHONE (OUTPATIENT)
Dept: FAMILY MEDICINE CLINIC | Facility: CLINIC | Age: 68
End: 2023-09-06

## 2023-09-06 RX ORDER — TRIAMCINOLONE ACETONIDE 5 MG/G
CREAM TOPICAL
Qty: 15 G | Refills: 0 | Status: SHIPPED | OUTPATIENT
Start: 2023-09-06

## 2023-09-06 NOTE — TELEPHONE ENCOUNTER
Spoke with pt. C/o a red, raised rash \"firm bumps\" on RLE x1 week. Only itchy if she scratches the area. Rash has not spread, no drng. Has not tried anything OTC. Pt thinks it may be poison ivy? Pt asks what she can/should try? Advised to send a picture in a [employer] message.   She will do this soon

## 2023-09-06 NOTE — TELEPHONE ENCOUNTER
PT. THINKS SHE HAS POISON IVY AND ASKING FOR SOMETHING FOR THE RASH. I OFFERED HER APPT. BUT SHE SAID SHE IS JUST ASKING WHAT OTC OINTMENT SHE SHOULD GET.

## 2023-09-06 NOTE — TELEPHONE ENCOUNTER
From: Lynn Grimes  To: Ester Jaquez DO  Sent: 9/6/2023 2:41 PM CDT  Subject: Rash - Laverne Lobo's leg    I was told by the [place] consultant that I should contact you for med recommendation, since I've had this rash for over a week. I was looking for something to dry it up and/or stop the itching. I believe I got it from my dog when she flopped into a pile of bushes that contained poisoned ivy, or oak. ..

## 2023-09-12 ENCOUNTER — TELEPHONE (OUTPATIENT)
Dept: FAMILY MEDICINE CLINIC | Facility: CLINIC | Age: 68
End: 2023-09-12

## 2023-10-27 ENCOUNTER — OFFICE VISIT (OUTPATIENT)
Dept: FAMILY MEDICINE CLINIC | Facility: CLINIC | Age: 68
End: 2023-10-27

## 2023-10-27 VITALS
HEART RATE: 77 BPM | BODY MASS INDEX: 32 KG/M2 | DIASTOLIC BLOOD PRESSURE: 78 MMHG | RESPIRATION RATE: 18 BRPM | WEIGHT: 199 LBS | TEMPERATURE: 98 F | OXYGEN SATURATION: 97 % | SYSTOLIC BLOOD PRESSURE: 124 MMHG

## 2023-10-27 DIAGNOSIS — R03.0 ELEVATED BLOOD PRESSURE READING: Primary | ICD-10-CM

## 2023-10-27 PROCEDURE — 3078F DIAST BP <80 MM HG: CPT

## 2023-10-27 PROCEDURE — 1170F FXNL STATUS ASSESSED: CPT

## 2023-10-27 PROCEDURE — 1159F MED LIST DOCD IN RCRD: CPT

## 2023-10-27 PROCEDURE — 1160F RVW MEDS BY RX/DR IN RCRD: CPT

## 2023-10-27 PROCEDURE — 3074F SYST BP LT 130 MM HG: CPT

## 2023-10-27 PROCEDURE — 99212 OFFICE O/P EST SF 10 MIN: CPT

## 2023-11-22 ENCOUNTER — TELEPHONE (OUTPATIENT)
Dept: FAMILY MEDICINE CLINIC | Facility: CLINIC | Age: 68
End: 2023-11-22

## 2023-11-22 NOTE — TELEPHONE ENCOUNTER
PT HAS TWO GRANDSONS WHO TESTED POSITIVE FOR STREP ON 11/21/23. TAKING ABX FOR 24 HOURS NOW. ARE THEY STILL CONTAGIOUS? THEY ARE WORRIED ABOUT ELDERLY FAMILY MEMBERS.

## 2023-11-22 NOTE — TELEPHONE ENCOUNTER
Spoke with pt below. Advised that we can't comment on patients that are not ours. Genoveva Montgomery concerned about the siblings of the kids with strep--that they might \"come down with it too\". She doesn't want the elderly guests to be exposed. Genoveva Montgomery states she will have the family of the sick children stay home tomorrow.

## 2024-02-12 NOTE — TELEPHONE ENCOUNTER
Cora for brief trial of steroids, prednisone 20 mg twice daily x5 days  Follow-up with dermatologist Ambulatory

## 2024-03-26 ENCOUNTER — OFFICE VISIT (OUTPATIENT)
Facility: LOCATION | Age: 69
End: 2024-03-26
Payer: MEDICARE

## 2024-03-26 DIAGNOSIS — H93.12 LEFT-SIDED TINNITUS: ICD-10-CM

## 2024-03-26 DIAGNOSIS — H81.02 MENIERE'S DISEASE OF LEFT EAR: Primary | ICD-10-CM

## 2024-03-26 DIAGNOSIS — H90.3 SENSORINEURAL HEARING LOSS, ASYMMETRICAL: Primary | ICD-10-CM

## 2024-03-26 PROCEDURE — 92557 COMPREHENSIVE HEARING TEST: CPT | Performed by: AUDIOLOGIST

## 2024-03-26 PROCEDURE — 92567 TYMPANOMETRY: CPT | Performed by: AUDIOLOGIST

## 2024-03-26 PROCEDURE — 99214 OFFICE O/P EST MOD 30 MIN: CPT | Performed by: OTOLARYNGOLOGY

## 2024-03-26 RX ORDER — TRIAMTERENE AND HYDROCHLOROTHIAZIDE 37.5; 25 MG/1; MG/1
1 CAPSULE ORAL EVERY MORNING
Qty: 90 CAPSULE | Refills: 2 | Status: SHIPPED | OUTPATIENT
Start: 2024-03-26

## 2024-03-26 NOTE — PROGRESS NOTES
Marry Lobo was seen for an audiometric evaluation and tympanogram today. Referred back to physician.    Jackie Hopper, AuD

## 2024-03-26 NOTE — PROGRESS NOTES
Marry Lobo is a 68 year old female. No chief complaint on file.    HPI:   Patient recently had been experiencing some increase fullness in her left ear.  She has a history of hydrops and Ménière's disease treated by Dr. Valenzuela approximately 11 years ago.  She feels that the tinnitus in the left side is more pulsatile.  She denies any dizziness.  At times she feels as if there is a beating drum in her left ear.  She has no prior history or surgery head trauma.  She has not been taking her Dyazide.  Current Outpatient Medications   Medication Sig Dispense Refill    triamterene-hydroCHLOROthiazide 37.5-25 MG Oral Cap Take 1 capsule by mouth every morning. 90 capsule 2    triamterene-hydroCHLOROthiazide 37.5-25 MG Oral Cap Take 1 capsule by mouth every morning. (Patient not taking: Reported on 10/27/2023) 30 capsule 0    VYZULTA 0.024 % Ophthalmic Solution Place 1 drop into both eyes nightly.      Carteolol HCl 1 % Ophthalmic Solution Place 1 drop into both eyes daily.      brinzolamide 1 % Ophthalmic Suspension Place 1 drop into both eyes 2 (two) times daily.        Past Medical History:   Diagnosis Date    Glaucoma     managed by Dr Zamudio @ [facility]      Social History:  Social History     Socioeconomic History    Marital status:     Number of children: 4   Occupational History    Occupation:    Tobacco Use    Smoking status: Never    Smokeless tobacco: Never   Vaping Use    Vaping Use: Never used   Substance and Sexual Activity    Drug use: No   Other Topics Concern    Caffeine Concern Yes     Comment: 2 cups /day    Exercise No    Seat Belt Yes    Special Diet No    Stress Concern No    Weight Concern Yes      Past Surgical History:   Procedure Laterality Date    COLONOSCOPY  2016    per pt , no records         REVIEW OF SYSTEMS:   GENERAL HEALTH: feels well otherwise  GENERAL : denies fever, chills, sweats, weight loss, weight gain  SKIN: denies any unusual skin lesions or  rashes  RESPIRATORY: denies shortness of breath with exertion  NEURO: denies headaches    EXAM:   There were no vitals taken for this visit.  System Findings Details   Skin Normal Inspection - Normal.   Constitutional Normal Overall appearance - Normal.   Head/Face Normal Facial features - Normal. Eyebrows - Normal. Skull - Normal.   Oral/Oropharynx Normal Lips - Normal, Tonsils - Normal, Tongue - Normal    Nasal Normal External nose - Normal. Nasal septum - Normal, Turbinates - Normal   Neurological Normal Memory - Normal. Cranial nerves - Cranial nerves II through XII grossly intact.   Neck Exam Normal Inspection - Normal. Palpation - Normal. Parotid gland - Normal. Thyroid gland - Normal.   Psychiatric Normal Orientation - Oriented to time, place, person & situation. Appropriate mood and affect.   Lymph Detail Normal Submental. Submandibular. Anterior cervical. Posterior cervical. Supraclavicular.   Eyes Normal Conjunctiva - Right: Normal, Left: Normal. Pupil - Right: Normal, Left: Normal.    Ears Normal Inspection - Right: Normal, Left: Normal. Canal - Left: Normal. TM - Right: Normal, Left: Normal.     Audiogram shows asymmetric hearing which is similar to October 10, 2013.        ASSESSMENT AND PLAN:   1. Meniere's disease of left ear.  Patient is experiencing exacerbation of symptoms.  Patient had done well on Dyazide so we will resume her treatment.  She was given refills for 3 months with 2 refills.  If symptoms worsen or change she will return.  I advised her to use Claritin for any type of sinus issue.      The patient indicates understanding of these issues and agrees to the plan.      Cr Porras MD  3/26/2024  2:17 PM

## 2024-03-27 ENCOUNTER — TELEPHONE (OUTPATIENT)
Facility: LOCATION | Age: 69
End: 2024-03-27

## 2024-03-27 NOTE — TELEPHONE ENCOUNTER
Pt called and stated she was seen yesterday and had an audiogram done however she's doesn't quite understand the results. She would like a call back tomorrow after 12pm

## 2024-04-26 ENCOUNTER — OFFICE VISIT (OUTPATIENT)
Dept: FAMILY MEDICINE CLINIC | Facility: CLINIC | Age: 69
End: 2024-04-26
Payer: MEDICARE

## 2024-04-26 VITALS
WEIGHT: 196 LBS | OXYGEN SATURATION: 98 % | BODY MASS INDEX: 31.5 KG/M2 | TEMPERATURE: 98 F | HEART RATE: 69 BPM | RESPIRATION RATE: 18 BRPM | DIASTOLIC BLOOD PRESSURE: 78 MMHG | HEIGHT: 66 IN | SYSTOLIC BLOOD PRESSURE: 126 MMHG

## 2024-04-26 DIAGNOSIS — S81.811D LACERATION OF RIGHT LOWER EXTREMITY, SUBSEQUENT ENCOUNTER: Primary | ICD-10-CM

## 2024-04-26 DIAGNOSIS — D12.6 TUBULAR ADENOMA OF COLON: ICD-10-CM

## 2024-04-26 DIAGNOSIS — Z12.11 SCREEN FOR COLON CANCER: ICD-10-CM

## 2024-04-26 PROCEDURE — 99213 OFFICE O/P EST LOW 20 MIN: CPT | Performed by: FAMILY MEDICINE

## 2024-04-26 NOTE — PATIENT INSTRUCTIONS
I reviewed emergency room notes, updated immunization record  Routine wound care reviewed.  Discussed importance of follow-up colonoscopy.  Patient referred to Dr. Mcnamara

## 2024-04-26 NOTE — PROGRESS NOTES
Marry Lobo is a 68 year old female.  Chief Complaint   Patient presents with    ER F/U       HPI:   Patient seen evaluated emergency room on 4/13 after suffering a laceration on her right lower leg following a fall.  Wound was cleaned and repaired with 7 staples being placed.  She was given a tetanus booster.  Current Outpatient Medications   Medication Sig Dispense Refill    triamterene-hydroCHLOROthiazide 37.5-25 MG Oral Cap Take 1 capsule by mouth every morning. 90 capsule 2    VYZULTA 0.024 % Ophthalmic Solution Place 1 drop into both eyes nightly.      Carteolol HCl 1 % Ophthalmic Solution Place 1 drop into both eyes daily.      brinzolamide 1 % Ophthalmic Suspension Place 1 drop into both eyes 2 (two) times daily.        Past Medical History:    Glaucoma    managed by Dr Zamudio @ [facility]      Social History:  Social History     Socioeconomic History    Marital status:     Number of children: 4   Occupational History    Occupation:    Tobacco Use    Smoking status: Never    Smokeless tobacco: Never   Vaping Use    Vaping status: Never Used   Substance and Sexual Activity    Drug use: No   Other Topics Concern    Caffeine Concern Yes     Comment: 2 cups /day    Exercise No    Seat Belt Yes    Special Diet No    Stress Concern No    Weight Concern Yes        REVIEW OF SYSTEMS:   GENERAL HEALTH: feels well otherwise, denies fatigue, appetite ok  SKIN: see hpi  ENT: denies nasal congestion, pnd, sore throat, ear pain or pressure, decreased hearing  RESPIRATORY: denies shortness of breath with exertion,cough, wheezing  CARDIOVASCULAR: denies chest pain on exertion, edema  GI: denies abdominal pain and denies heartburn  NEURO: denies headaches  PSYCH: denies feeling stressed or anxious    EXAM:   /78 (BP Location: Left arm, Patient Position: Sitting, Cuff Size: adult)   Pulse 69   Temp 98.2 °F (36.8 °C) (Temporal)   Resp 18   Ht 5' 6\" (1.676 m)   Wt 196 lb (88.9 kg)    SpO2 98%   BMI 31.64 kg/m²   GENERAL: well developed, well nourished,in no apparent distress, well hydrated  SKIN: no rashes,no suspicious lesions  ENT: TMs: intact, good mobility, Nose: turbinates clear, no dc, Throat: no erythema, pnd, or lesions  NECK: supple,no adenopathy,no bruits, no thyromegaly  LUNGS: clear to auscultation, no w/r/r  CARDIO: RRR without murmur, peripheral pulses intact  GI: good BS's,no masses, HSM or tenderness  Right lower leg: Approximately 4 cm diagonal wound over the fibular head with 7 staples in place, no signs of infection, staples removed without difficulty, no wound margin separation, patient tolerated well    ASSESSMENT AND PLAN:     Encounter Diagnoses   Name Primary?    Laceration of right lower extremity, subsequent encounter Yes    Screen for colon cancer     Tubular adenoma of colon      No orders of the defined types were placed in this encounter.    Meds & Refills for this Visit:  Requested Prescriptions      No prescriptions requested or ordered in this encounter     Imaging & Consults:  GASTRO - INTERNAL  No follow-ups on file.  Patient Instructions   I reviewed emergency room notes, updated immunization record  Routine wound care reviewed.  Discussed importance of follow-up colonoscopy.  Patient referred to Dr. Mcnamara  The patient indicates understanding of these issues and agrees to the plan.    Tanner Winter DO, FAAFP

## 2024-06-12 ENCOUNTER — TELEPHONE (OUTPATIENT)
Dept: FAMILY MEDICINE CLINIC | Facility: CLINIC | Age: 69
End: 2024-06-12

## 2024-06-12 NOTE — TELEPHONE ENCOUNTER
Spoke with pt. C/o B eyes feeling dry and like there is something in them at times. Reports this started ~5days ago---eyes were red and watery. The redness has resolved, but one eyelid is still swollen and the light bothers them. No drng.  States she called her ophthalmologists ofc and was told it sounded like \"dry eyes\" and she can try some OTC drops. A friend recommended OTC Lumify. Pt asks if we agree?  Asked pt when she is due to see ophthalmologist? Pt reports she sees him Q4-6 months for glaucoma, and is due this month.  Recommended she call them again and ask if she can be seen soon.  Pt will do this.

## 2024-09-20 PROBLEM — Z86.0101 HISTORY OF ADENOMATOUS POLYP OF COLON: Status: ACTIVE | Noted: 2024-09-20

## 2024-09-20 PROBLEM — D12.0 BENIGN NEOPLASM OF CECUM: Status: ACTIVE | Noted: 2024-09-20

## 2024-09-20 PROBLEM — K63.5 POLYP OF COLON: Status: ACTIVE | Noted: 2024-09-20

## 2024-09-20 PROBLEM — D12.4 BENIGN NEOPLASM OF DESCENDING COLON: Status: ACTIVE | Noted: 2024-09-20

## 2024-09-20 PROBLEM — D12.3 BENIGN NEOPLASM OF TRANSVERSE COLON: Status: ACTIVE | Noted: 2024-09-20

## 2024-12-29 ENCOUNTER — HOSPITAL ENCOUNTER (OUTPATIENT)
Age: 69
Discharge: HOME | End: 2024-12-29
Payer: MEDICARE

## 2024-12-29 VITALS
OXYGEN SATURATION: 98 % | DIASTOLIC BLOOD PRESSURE: 80 MMHG | SYSTOLIC BLOOD PRESSURE: 158 MMHG | TEMPERATURE: 100 F | HEIGHT: 66 IN | WEIGHT: 180 LBS | BODY MASS INDEX: 28.93 KG/M2 | RESPIRATION RATE: 16 BRPM | HEART RATE: 65 BPM

## 2024-12-29 DIAGNOSIS — J06.9 UPPER RESPIRATORY TRACT INFECTION, UNSPECIFIED TYPE: Primary | ICD-10-CM

## 2024-12-29 NOTE — ED PROVIDER NOTES
Patient Seen in: [facility]      History     Chief Complaint   Patient presents with    Cough/URI     Stated Complaint: coughing sinus drainage    Subjective:   HPI      69 year old female presents to  with cough, congestions, x few days. Patient reports a fever yesterday, none today. Reports feeling better overall, wants to make sure nothing to settled in her chest.    Objective:     Past Medical History:    Chronic cough    Glaucoma    managed by Dr Zamudio @ [facility]    Hearing loss    Indigestion    Stress    Wears glasses              Past Surgical History:   Procedure Laterality Date    Colonoscopy  01/31/2017    tubular adenoma                Social History     Socioeconomic History    Marital status:     Number of children: 4   Occupational History    Occupation:    Tobacco Use    Smoking status: Never    Smokeless tobacco: Never   Vaping Use    Vaping status: Never Used   Substance and Sexual Activity    Alcohol use: Yes     Alcohol/week: 1.0 standard drink of alcohol     Types: 1 Glasses of wine per week     Comment: occasionally    Drug use: No   Other Topics Concern    Caffeine Concern Yes     Comment: 2 cups /day    Exercise No    Seat Belt Yes    Special Diet No    Stress Concern No    Weight Concern Yes              Review of Systems    Positive for stated complaint: coughing sinus drainage  Other systems are as noted in HPI.  Constitutional and vital signs reviewed.      All other systems reviewed and negative except as noted above.    Physical Exam     ED Triage Vitals [12/29/24 1534]   /80   Pulse 65   Resp 16   Temp 99.6 °F (37.6 °C)   Temp src Oral   SpO2 98 %   O2 Device None (Room air)       Current Vitals:   Vital Signs  BP: 158/80  Pulse: 65  Resp: 16  Temp: 99.6 °F (37.6 °C)  Temp src: Oral    Oxygen Therapy  SpO2: 98 %  O2 Device: None (Room air)        Physical Exam  Vitals and nursing note reviewed.   Constitutional:       Appearance: She  is well-developed.   HENT:      Head: Atraumatic.      Right Ear: Tympanic membrane and external ear normal.      Left Ear: Tympanic membrane and external ear normal.      Nose: Congestion present.      Mouth/Throat:      Mouth: Mucous membranes are moist.      Pharynx: Oropharynx is clear. No posterior oropharyngeal erythema.      Comments: PND noted  Eyes:      Conjunctiva/sclera: Conjunctivae normal.   Cardiovascular:      Rate and Rhythm: Normal rate and regular rhythm.   Pulmonary:      Effort: Pulmonary effort is normal.      Breath sounds: Normal breath sounds.   Musculoskeletal:      Cervical back: Normal range of motion and neck supple.   Skin:     General: Skin is warm and dry.      Capillary Refill: Capillary refill takes less than 2 seconds.   Neurological:      Mental Status: She is alert and oriented to person, place, and time.   Psychiatric:         Mood and Affect: Mood normal.             ED Course   Labs Reviewed - No data to display                MDM        69 year old female presents with cough and congestions x few days. Fever that is now resolved.    Differential diagnosis includes but not limited to:  Uri, bronchitis, sinusitis    Exam is consistent with URI. Lungs are clear, no indication for CXR. Symptoms are improving, likely viral. Advised continued supportive care at home.      Medical Decision Making      Disposition and Plan     Clinical Impression:  1. Upper respiratory tract infection, unspecified type         Disposition:  Discharge  12/29/2024  3:42 pm    Follow-up:  Tanner Winter, DO  [address] [facility] [address]  [phone]                Medications Prescribed:  Current Discharge Medication List              Supplementary Documentation:

## 2024-12-29 NOTE — ED INITIAL ASSESSMENT (HPI)
Pt with c/o cough that started yesterday.  Pt c/o fever yesterday that resolved last night.  Pt states feels better today.

## 2025-01-24 ENCOUNTER — TELEPHONE (OUTPATIENT)
Dept: FAMILY MEDICINE CLINIC | Facility: CLINIC | Age: 70
End: 2025-01-24

## 2025-01-24 DIAGNOSIS — Z78.0 POSTMENOPAUSAL: Primary | ICD-10-CM

## 2025-01-24 NOTE — TELEPHONE ENCOUNTER
Wondering if should get bone scan due to age and would like to talk to nurse regarding if this should be done

## 2025-01-29 NOTE — TELEPHONE ENCOUNTER
I agree with the screening bone density test.  This can be done in our [place] office, order placed    Tanner Winter, DO

## 2025-01-29 NOTE — TELEPHONE ENCOUNTER
Patient called back - She is concerned d/t her age. No obvious symptoms. Does not take any calcium supplements. No family hx of osteoporosis that she is aware of. She would like to have a Dexa scan ordered for peace of mind and if Dr Winter thinks it is appropriate.

## 2025-01-30 NOTE — TELEPHONE ENCOUNTER
Detailed message left with patient informing her that the order is placed and she can contact central scheduling to get this done in [place]. Instructed to call back here if any questions or concerns.

## 2025-04-03 NOTE — TELEPHONE ENCOUNTER
Spoke with patient and she is going to go to IC in [facility] for evaluation where they can order the AGUILAR if needed. Never smoker

## 2025-09-18 ENCOUNTER — APPOINTMENT (OUTPATIENT)
Dept: PODIATRY | Age: 70
End: 2025-09-18